# Patient Record
Sex: MALE | Race: BLACK OR AFRICAN AMERICAN | Employment: FULL TIME | URBAN - METROPOLITAN AREA
[De-identification: names, ages, dates, MRNs, and addresses within clinical notes are randomized per-mention and may not be internally consistent; named-entity substitution may affect disease eponyms.]

---

## 2019-07-15 ENCOUNTER — OFFICE VISIT (OUTPATIENT)
Dept: CARDIOLOGY CLINIC | Age: 59
End: 2019-07-15
Payer: COMMERCIAL

## 2019-07-15 VITALS
SYSTOLIC BLOOD PRESSURE: 112 MMHG | WEIGHT: 174 LBS | DIASTOLIC BLOOD PRESSURE: 72 MMHG | BODY MASS INDEX: 25.77 KG/M2 | HEART RATE: 63 BPM | HEIGHT: 69 IN

## 2019-07-15 DIAGNOSIS — I49.9 IRREGULAR HEART BEAT: Primary | ICD-10-CM

## 2019-07-15 DIAGNOSIS — R06.02 SOB (SHORTNESS OF BREATH): ICD-10-CM

## 2019-07-15 DIAGNOSIS — Z87.898 HISTORY OF PALPITATIONS: ICD-10-CM

## 2019-07-15 PROCEDURE — 99244 OFF/OP CNSLTJ NEW/EST MOD 40: CPT | Performed by: INTERNAL MEDICINE

## 2019-07-15 PROCEDURE — 93000 ELECTROCARDIOGRAM COMPLETE: CPT | Performed by: INTERNAL MEDICINE

## 2019-07-15 NOTE — PROGRESS NOTES
is oriented to person, place, and time. He appears well-developed and well-nourished. In no acute distress. Head: Normocephalic and atraumatic. Eyes: MARGARET   Neck: Neck supple. No JVD present. Carotid bruit is not present. No mass and no thyromegaly present. No lymphadenopathy present. Cardiovascular: Normal rate, regular rhythm, normal heart sounds and intact distal pulses. Exam reveals no gallop and no friction rub. Soft Systolic murmur  Pulmonary/Chest: Effort normal and breath sounds normal. No respiratory distress. He has no wheezes, rhonchi or rales. Abdominal: Soft, non-tender. Bowel sounds and aorta are normal. He exhibits no organomegaly, mass or bruit. Extremities: No edema, cyanosis, or clubbing. Pulses are 2+ radial/carotid/dorsalis pedis and posterior tibial bilaterally. Neurological: He is alert and oriented to person, place, and time. He has normal reflexes. No cranial nerve deficit. Coordination normal.   Skin: Skin is warm and dry. There is no rash or diaphoresis. Psychiatric: He has a normal mood and affect. His speech is normal and behavior is normal.     Lab Review:   No results found for: TRIG, HDL, LDLCALC, LDLDIRECT, LABVLDL  No results found for: NA, K, BUN, CREATININE  No results for input(s): WBC, HGB, HCT, PLT in the last 72 hours. I have reviewed any available labs, images, any stress test, C on this pt       Assessment:     hx of arrhthymias / MVP  / denies any palpations sob edema cp MIKI PND   hx palpitations / no c/o cp SOB  Non smoker in [place]    exercises / has two teenagers       EKG 7/15/19 today WNL    On 7/5/2019 sinus rhythm 63/min. Normal study. Reviewed most recent tests with pt     Plan:   Blood work per PCP    Echo to assess for MVP? Fu in one year       [facility] APRNKELLY   This patient is stable. He apparently had seen me about 8 years ago. He clinically is stable. He has been labeled in the past has had a mitral valve prolapse.   He is